# Patient Record
Sex: FEMALE
[De-identification: names, ages, dates, MRNs, and addresses within clinical notes are randomized per-mention and may not be internally consistent; named-entity substitution may affect disease eponyms.]

---

## 2018-01-09 ENCOUNTER — HOSPITAL ENCOUNTER (OUTPATIENT)
Dept: HOSPITAL 14 - H.ER | Age: 53
Setting detail: OBSERVATION
LOS: 1 days | Discharge: HOME | End: 2018-01-10
Attending: FAMILY MEDICINE | Admitting: FAMILY MEDICINE
Payer: COMMERCIAL

## 2018-01-09 DIAGNOSIS — F41.9: ICD-10-CM

## 2018-01-09 DIAGNOSIS — F32.9: ICD-10-CM

## 2018-01-09 DIAGNOSIS — G45.4: Primary | ICD-10-CM

## 2018-01-09 DIAGNOSIS — E03.9: ICD-10-CM

## 2018-01-09 DIAGNOSIS — R73.03: ICD-10-CM

## 2018-01-09 LAB
ALBUMIN SERPL-MCNC: 4.4 G/DL (ref 3.5–5)
ALBUMIN/GLOB SERPL: 1.3 {RATIO} (ref 1–2.1)
ALT SERPL-CCNC: 43 U/L (ref 9–52)
APTT BLD: 29.4 SECONDS (ref 25.6–37.1)
AST SERPL-CCNC: 47 U/L (ref 14–36)
BACTERIA #/AREA URNS HPF: (no result) /[HPF]
BASOPHILS # BLD AUTO: 0 K/UL (ref 0–0.2)
BASOPHILS NFR BLD: 0.3 % (ref 0–2)
BILIRUB UR-MCNC: NEGATIVE MG/DL
BNP SERPL-MCNC: 134 PG/ML (ref 0–900)
BUN SERPL-MCNC: 14 MG/DL (ref 7–17)
CALCIUM SERPL-MCNC: 9 MG/DL (ref 8.4–10.2)
COLOR UR: (no result)
EOSINOPHIL # BLD AUTO: 0.1 K/UL (ref 0–0.7)
EOSINOPHIL NFR BLD: 0.7 % (ref 0–4)
ERYTHROCYTE [DISTWIDTH] IN BLOOD BY AUTOMATED COUNT: 14.4 % (ref 11.5–14.5)
GFR NON-AFRICAN AMERICAN: > 60
GLUCOSE UR STRIP-MCNC: (no result) MG/DL
HDLC SERPL-MCNC: 56 MG/DL (ref 30–70)
HGB BLD-MCNC: 12.5 G/DL (ref 12–16)
INR PPP: 1 (ref 0.9–1.2)
LDLC SERPL-MCNC: 87 MG/DL (ref 0–129)
LEUKOCYTE ESTERASE UR-ACNC: (no result) LEU/UL
LYMPHOCYTES # BLD AUTO: 2.4 K/UL (ref 1–4.3)
LYMPHOCYTES NFR BLD AUTO: 19.3 % (ref 20–40)
MCH RBC QN AUTO: 31.2 PG (ref 27–31)
MCHC RBC AUTO-ENTMCNC: 33 G/DL (ref 33–37)
MCV RBC AUTO: 94.4 FL (ref 81–99)
MONOCYTES # BLD: 0.8 K/UL (ref 0–0.8)
MONOCYTES NFR BLD: 6.1 % (ref 0–10)
NEUTROPHILS # BLD: 9.2 K/UL (ref 1.8–7)
NEUTROPHILS NFR BLD AUTO: 73.6 % (ref 50–75)
NRBC BLD AUTO-RTO: 0.1 % (ref 0–0)
PH UR STRIP: 7 [PH] (ref 5–8)
PLATELET # BLD: 281 K/UL (ref 130–400)
PMV BLD AUTO: 8 FL (ref 7.2–11.7)
PROT UR STRIP-MCNC: NEGATIVE MG/DL
PROTHROMBIN TIME: 10.9 SECONDS (ref 9.8–13.1)
RBC # BLD AUTO: 4.01 MIL/UL (ref 3.8–5.2)
RBC # UR STRIP: (no result) /UL
SP GR UR STRIP: < 1.005 (ref 1–1.03)
SQUAMOUS EPITHIAL: < 1 /HPF (ref 0–5)
URINE CLARITY: CLEAR
URINE NITRATE: NEGATIVE
UROBILINOGEN UR-MCNC: (no result) MG/DL (ref 0.2–1)
WBC # BLD AUTO: 12.5 K/UL (ref 4.8–10.8)

## 2018-01-09 PROCEDURE — 80353 DRUG SCREENING COCAINE: CPT

## 2018-01-09 PROCEDURE — 80324 DRUG SCREEN AMPHETAMINES 1/2: CPT

## 2018-01-09 PROCEDURE — 80361 OPIATES 1 OR MORE: CPT

## 2018-01-09 PROCEDURE — 80345 DRUG SCREENING BARBITURATES: CPT

## 2018-01-09 PROCEDURE — 84484 ASSAY OF TROPONIN QUANT: CPT

## 2018-01-09 PROCEDURE — 85730 THROMBOPLASTIN TIME PARTIAL: CPT

## 2018-01-09 PROCEDURE — 93005 ELECTROCARDIOGRAM TRACING: CPT

## 2018-01-09 PROCEDURE — 80349 CANNABINOIDS NATURAL: CPT

## 2018-01-09 PROCEDURE — 99285 EMERGENCY DEPT VISIT HI MDM: CPT

## 2018-01-09 PROCEDURE — 93880 EXTRACRANIAL BILAT STUDY: CPT

## 2018-01-09 PROCEDURE — 70450 CT HEAD/BRAIN W/O DYE: CPT

## 2018-01-09 PROCEDURE — 80346 BENZODIAZEPINES1-12: CPT

## 2018-01-09 PROCEDURE — 81003 URINALYSIS AUTO W/O SCOPE: CPT

## 2018-01-09 PROCEDURE — 82948 REAGENT STRIP/BLOOD GLUCOSE: CPT

## 2018-01-09 PROCEDURE — 83880 ASSAY OF NATRIURETIC PEPTIDE: CPT

## 2018-01-09 PROCEDURE — 84443 ASSAY THYROID STIM HORMONE: CPT

## 2018-01-09 PROCEDURE — 85025 COMPLETE CBC W/AUTO DIFF WBC: CPT

## 2018-01-09 PROCEDURE — 85610 PROTHROMBIN TIME: CPT

## 2018-01-09 PROCEDURE — 83036 HEMOGLOBIN GLYCOSYLATED A1C: CPT

## 2018-01-09 PROCEDURE — 70551 MRI BRAIN STEM W/O DYE: CPT

## 2018-01-09 PROCEDURE — 71045 X-RAY EXAM CHEST 1 VIEW: CPT

## 2018-01-09 PROCEDURE — 85651 RBC SED RATE NONAUTOMATED: CPT

## 2018-01-09 PROCEDURE — 80053 COMPREHEN METABOLIC PANEL: CPT

## 2018-01-09 PROCEDURE — 93306 TTE W/DOPPLER COMPLETE: CPT

## 2018-01-09 PROCEDURE — 36415 COLL VENOUS BLD VENIPUNCTURE: CPT

## 2018-01-09 PROCEDURE — 80358 DRUG SCREENING METHADONE: CPT

## 2018-01-09 PROCEDURE — 83992 ASSAY FOR PHENCYCLIDINE: CPT

## 2018-01-09 PROCEDURE — 80061 LIPID PANEL: CPT

## 2018-01-09 PROCEDURE — 81025 URINE PREGNANCY TEST: CPT

## 2018-01-09 NOTE — CP.PCM.HP
History of Present Illness





- History of Present Illness


History of Present Illness: 


53 yr old F brought to ED by ambulance for acute episode of AMS during her gym 

class this morning. PMHx includes hypothyroidism, prediabetes, anxiety and 

depression. Patient reports she was at the gym and during a "Bar Class" started 

to feel dizzy. She does not remember what happened after that nor the ambulance 

ride to the ED. Her  witnessed her episode of AMS and reported 

that she started to walk away from the exercise class before it was over, he 

found this odd and when talking to her it seemed she was confused, he proceeded 

to call an ambulance as she was acting not as herself. Denies syncope, head 

trauma, weakness, nausea, vomiting or diarrhea. Patient reports she did not eat 

breakfast before her gym class, she usually does not eat breakfast. Patient 

reports she has been exercising regularly 3 times a week for the past 3 months.





PMD: none


Specialists: Librado Cheek -Endocrinologist





LMP: 17, regular menses


OBHx: 





PMHx: hypothyroidism, prediabetes, anxiety and depression


SurgHx: appendectomy, D&C x 2 s/p miscarriage, uterine fibroidectomy


FMHx: mother is 74-has NIDDM, father  at 60 yrs old from brain cancer, 

siblings are healthy 


SocHx: denies smoking, drinks >7 glasses of wine on weekends (fri/sat/sun), 

denies drugs; lives with , does not work


Medications: alternate dose of Levothyroxine 75mcg and Levothyroxine 50 mcg QD, 

Metformin 500mg PO BID


Allergies: NKDA





Present on Admission





- Present on Admission


Any Indicators Present on Admission: No


History of DVT/PE: No


History of Uncontrolled Diabetes: No


Urinary Catheter: No


Decubitus Ulcer Present: No


History Surgical Site Infection Following: None





Review of Systems





- Review of Systems


All systems: reviewed and no additional remarkable complaints except (for what 

is mentioned in the HPI)





- Constitutional


Constitutional: absent: Chills, Headache





- EENT


Eyes: absent: Blurred Vision, Change in Vision


Ears: Dizziness


Nose/Mouth/Throat: absent: Nasal Congestion, Nasal Discharge





- Cardiovascular


Cardiovascular: absent: Chest Pain, Dyspnea





- Respiratory


Respiratory: absent: Hemoptysis





- Gastrointestinal


Gastrointestinal: absent: Abdominal Pain, Constipation, Diarrhea





- Genitourinary


Genitourinary: absent: Difficulty Urinating, Dysuria





- Reproductive: Female


Reproductive:Female: Normal Menses





- Musculoskeletal


Musculoskeletal: absent: Arthralgias, Myalgias





- Neurological


Neurological: absent: Syncope





- Psychiatric


Psychiatric: Anxiety, Depression





- Hematologic/Lymphatic


Hematologic: absent: Easy Bleeding, Easy Bruising





Past Patient History





- Past Medical History & Family History


Past Medical History?: Yes





- Past Social History


Alcohol: Social


Drugs: Denies





- CARDIAC


Hx Cardiac Disorders: No


Hx Angina: No


Hx Atrial Fibrillation: No


Hx Cardia Arrhythmia: No


Hx Circulatory Problems: No


Hx Congestive Heart Failure: No


Hx Heart Attack: No


Hx Heart Murmur: No


Hx Heart Transplant: No


Hx Hypercholesterolemia: No


Hx Hypertension: No


Hx Hypotension: No


Hx Internal Defibrillator: No


Hx Mitral Valve Prolapse: No


Hx Pacemaker: No





- NEUROLOGICAL


Hx Dementia: No


Hx Dizziness: No


Hx Meningitis: No


Hx Migraine: No


Hx Multiple Sclerosis: No


Hx Paralysis: No


Hx Parkinson's Disease: No


Hx Seizures: No


Hx Syncope: No


Hx Transient Ischemic Attacks (TIA): No





- HEENT


Hx Blind: No


Hx Cataracts: No


Hx Deafness: No


Hx Difficulty Chewing: No


Hx Epistaxis: No


Hx Glaucoma: No


Hx Macular Degeneration: No


Hx Sinusitis: No





- ENDOCRINE/METABOLIC


Hx Endocrine Disorders: Yes


Hx Hyperthyroidism: Yes


Hx Hypothyroidism: Yes





- PSYCHIATRIC


Hx Substance Use: No





- SURGICAL HISTORY


Hx Surgeries: Yes


Hx Appendectomy: Yes


Other/Comment: Fibroid removal





- ANESTHESIA


Hx Anesthesia: Yes


Hx Anesthesia Reactions: No





Meds


Allergies/Adverse Reactions: 


 Allergies











Allergy/AdvReac Type Severity Reaction Status Date / Time


 


No Known Allergies Allergy   Verified 18 13:22














Physical Exam





- Constitutional


Appears: No Acute Distress





- Head Exam


Head Exam: ATRAUMATIC, NORMOCEPHALIC





- Eye Exam


Eye Exam: EOMI, PERRL





- ENT Exam


ENT Exam: Mucous Membranes Moist





- Neck Exam


Neck exam: Positive for: Full Rom





- Respiratory Exam


Respiratory Exam: Clear to Auscultation Bilateral, NORMAL BREATHING PATTERN





- Cardiovascular Exam


Cardiovascular Exam: REGULAR RHYTHM, +S1, +S2





- GI/Abdominal Exam


GI & Abdominal Exam: Normal Bowel Sounds, Soft.  absent: Tenderness





- Extremities Exam


Extremities exam: Positive for: full ROM.  Negative for: pedal edema





- Back Exam


Back exam: absent: CVA tenderness (L), CVA tenderness (R)





- Neurological Exam


Neurological exam: Alert, CN II-XII Intact, Oriented x3





- Psychiatric Exam


Psychiatric exam: Normal Affect, Normal Mood





- Skin


Skin Exam: Dry, Intact, Normal Color, Warm





Results





- Vital Signs


Recent Vital Signs: 





 Last Vital Signs











Temp  98.7 F   18 16:07


 


Pulse  78   18 16:07


 


Resp  16   18 16:07


 


BP  131/64   18 16:07


 


Pulse Ox  100   18 16:16














- Labs


Result Diagrams: 


 18 12:15





 18 12:15


Labs: 





 Laboratory Results - last 24 hr











  18





  11:42 12:15 12:15


 


WBC    12.5 H


 


RBC    4.01


 


Hgb    12.5


 


Hct    37.8


 


MCV    94.4


 


MCH    31.2 H


 


MCHC    33.0


 


RDW    14.4


 


Plt Count    281


 


MPV    8.0


 


Neut % (Auto)    73.6


 


Lymph % (Auto)    19.3 L


 


Mono % (Auto)    6.1


 


Eos % (Auto)    0.7


 


Baso % (Auto)    0.3


 


Neut #    9.2 H


 


Lymph #    2.4


 


Mono #    0.8


 


Eos #    0.1


 


Baso #    0.0


 


PT   


 


INR   


 


APTT   


 


Sodium   137 


 


Potassium   4.7 


 


Chloride   104 


 


Carbon Dioxide   21 L 


 


Anion Gap   17 


 


BUN   14 


 


Creatinine   0.6 L 


 


Est GFR ( Amer)   > 60 


 


Est GFR (Non-Af Amer)   > 60 


 


POC Glucose (mg/dL)  126 H  


 


Random Glucose   120 H 


 


Calcium   9.0 


 


Total Bilirubin   0.8 


 


AST   47 H 


 


ALT   43 


 


Alkaline Phosphatase   55 


 


Troponin I   < 0.0120 


 


NT-Pro-B Natriuret Pep   134 


 


Total Protein   7.8 


 


Albumin   4.4 


 


Globulin   3.4 


 


Albumin/Globulin Ratio   1.3 


 


Triglycerides   97 


 


Cholesterol   171 


 


LDL Cholesterol Direct   87 


 


HDL Cholesterol   56 


 


Urine Color   


 


Urine Clarity   


 


Urine pH   


 


Ur Specific Gravity   


 


Urine Protein   


 


Urine Glucose (UA)   


 


Urine Ketones   


 


Urine Blood   


 


Urine Nitrate   


 


Urine Bilirubin   


 


Urine Urobilinogen   


 


Ur Leukocyte Esterase   


 


Urine RBC (Auto)   


 


Urine Microscopic WBC   


 


Ur Squamous Epith Cells   


 


Urine Bacteria   


 


Urine Opiates Screen   


 


Urine Methadone Screen   


 


Ur Barbiturates Screen   


 


Ur Phencyclidine Scrn   


 


Ur Amphetamines Screen   


 


U Benzodiazepines Scrn   


 


U Oth Cocaine Metabols   


 


U Cannabinoids Screen   














  18





  13:03 14:26 15:49


 


WBC   


 


RBC   


 


Hgb   


 


Hct   


 


MCV   


 


MCH   


 


MCHC   


 


RDW   


 


Plt Count   


 


MPV   


 


Neut % (Auto)   


 


Lymph % (Auto)   


 


Mono % (Auto)   


 


Eos % (Auto)   


 


Baso % (Auto)   


 


Neut #   


 


Lymph #   


 


Mono #   


 


Eos #   


 


Baso #   


 


PT   10.9 


 


INR   1.0 


 


APTT   29.4 


 


Sodium   


 


Potassium   


 


Chloride   


 


Carbon Dioxide   


 


Anion Gap   


 


BUN   


 


Creatinine   


 


Est GFR ( Amer)   


 


Est GFR (Non-Af Amer)   


 


POC Glucose (mg/dL)   


 


Random Glucose   


 


Calcium   


 


Total Bilirubin   


 


AST   


 


ALT   


 


Alkaline Phosphatase   


 


Troponin I   


 


NT-Pro-B Natriuret Pep   


 


Total Protein   


 


Albumin   


 


Globulin   


 


Albumin/Globulin Ratio   


 


Triglycerides   


 


Cholesterol   


 


LDL Cholesterol Direct   


 


HDL Cholesterol   


 


Urine Color  Straw  


 


Urine Clarity  Clear  


 


Urine pH  7.0  


 


Ur Specific Gravity  < 1.005  


 


Urine Protein  Negative  


 


Urine Glucose (UA)  Neg  


 


Urine Ketones  Negative  


 


Urine Blood  Small  


 


Urine Nitrate  Negative  


 


Urine Bilirubin  Negative  


 


Urine Urobilinogen  0.2-1.0  


 


Ur Leukocyte Esterase  Neg  


 


Urine RBC (Auto)  1  


 


Urine Microscopic WBC  1  


 


Ur Squamous Epith Cells  < 1  


 


Urine Bacteria  Rare  


 


Urine Opiates Screen    Negative


 


Urine Methadone Screen    Negative


 


Ur Barbiturates Screen    Negative


 


Ur Phencyclidine Scrn    Negative


 


Ur Amphetamines Screen    Negative


 


U Benzodiazepines Scrn    Negative


 


U Oth Cocaine Metabols    Negative


 


U Cannabinoids Screen    Negative














Assessment & Plan





- Assessment and Plan (Free Text)


Assessment: 


53 yr old F admitted for acute episode of AMS. 





1. Altered Mental Status


-acute, improving


-likely secondary to TIA vs seizure vs vasovagal reaction


-CT head wnl, Brain MRI wnl, CBC: WBC 12.5, rest wnl, EKG wnl, CXR wnl, UA wnl, 

Utox negative


-Neurology consult appreciated-will follow recommendations


-f/u labs, carotid duplex





2. Prediabetes


-controlled, HbA1c 5.8


-continue home medications: Metformin 500 mg PO BID


-moderate carbohydrate diet





3. Hypothyroidism


-chronic, controlled


-continue home medications: alternate Levothyroxine 75 mcg and Levothyroxine 50 

mcg PO QD


-f/u TSH





4. Anxiety and Depression


-chronic, controlled


-will be referred to mental health as outpatient





5. DVT prophylaxis


-Lovenox 40mg SC QD











- Date & Time


Date: 18


Time: 15:45

## 2018-01-09 NOTE — RAD
HISTORY:

code stroke  



COMPARISON:

No prior. 



FINDINGS:



LUNGS:

No active pulmonary disease.



PLEURA:

No significant pleural effusion identified, no pneumothorax apparent.



CARDIOVASCULAR:

Normal.



OSSEOUS STRUCTURES:

No significant abnormalities.



VISUALIZED UPPER ABDOMEN:

Normal.



OTHER FINDINGS:

None.



IMPRESSION:

No active disease.

## 2018-01-09 NOTE — CT
PROCEDURE:  CT HEAD WITHOUT CONTRAST.



HISTORY:

code stroke



COMPARISON:

None available. 



TECHNIQUE:

Axial computed tomography images were obtained through the head/brain 

without intravenous contrast.  



Radiation dose:



Total exam DLP = 865.3 mGy-cm.



This CT exam was performed using one or more of the following dose 

reduction techniques: Automated exposure control, adjustment of the 

mA and/or kV according to patient size, and/or use of iterative 

reconstruction technique.



FINDINGS:



HEMORRHAGE:

No intracranial hemorrhage. 



BRAIN:

No mass effect or edema.  No atrophy or chronic microvascular 

ischemic changes.



VENTRICLES:

Unremarkable. No hydrocephalus. 



CALVARIUM:

Unremarkable.



PARANASAL SINUSES:

Unremarkable as visualized. No significant inflammatory changes.



MASTOID AIR CELLS:

Unremarkable as visualized. No inflammatory changes.



OTHER FINDINGS:

None.



IMPRESSION:

No acute intracranial pathology.  



Findings conveyed to Dr. Rosales by Dr. Morley at 12:20 p.m. on 

01/09/2018.

## 2018-01-09 NOTE — CP.PCM.CON
History of Present Illness





- History of Present Illness


History of Present Illness: 





           53 yr old right handed woman with pmh of prediabetes, and 

hypothyroidism, who is here for a discrete episode of forgetfulness with 

confusion and memory loss. The patient was in her [place] exercise class, a class 

in which she normally participates, and she suddenly felt disoriented and 

confused. The next thing she remembers, she was in the [facility]. There was no 

aphasia, no tonic clonic movements, no urinary incontinence, nor have there 

ever been any prior spells. She denies sleep deprivation, recent change in 

medications, surgeries, or illicit drug use, or concussion. On exam, the 

patient has a nonfocal exam. 





Review of Systems





- Review of Systems


Systems not reviewed;Unavailable: Altered Mental Status





- Neurological


Neurological: Behavioral Changes, Dizziness





Past Patient History





- Past Medical History & Family History


Past Medical History?: Yes





- Past Social History


Smoking Status: Never Smoked


Alcohol: Other


Drugs: Denies





- CARDIAC


Hx Cardiac Disorders: No


Hx Angina: No


Hx Atrial Fibrillation: No


Hx Cardia Arrhythmia: No


Hx Circulatory Problems: No


Hx Congestive Heart Failure: No


Hx Heart Attack: No


Hx Heart Murmur: No


Hx Heart Transplant: No


Hx Hypercholesterolemia: No


Hx Hypertension: No


Hx Hypotension: No


Hx Internal Defibrillator: No


Hx Mitral Valve Prolapse: No


Hx Pacemaker: No





- NEUROLOGICAL


Hx Dementia: No


Hx Dizziness: No


Hx Meningitis: No


Hx Migraine: No


Hx Multiple Sclerosis: No


Hx Paralysis: No


Hx Parkinson's Disease: No


Hx Seizures: No


Hx Syncope: No


Hx Transient Ischemic Attacks (TIA): No





- HEENT


Hx Blind: No


Hx Cataracts: No


Hx Deafness: No


Hx Difficulty Chewing: No


Hx Epistaxis: No


Hx Glaucoma: No


Hx Macular Degeneration: No


Hx Sinusitis: No





- ENDOCRINE/METABOLIC


Hx Endocrine Disorders: Yes


Hx Hyperthyroidism: Yes


Hx Hypothyroidism: Yes





- PSYCHIATRIC


Hx Substance Use: No





- SURGICAL HISTORY


Hx Surgeries: Yes


Hx Appendectomy: Yes


Other/Comment: Fibroid removal





- ANESTHESIA


Hx Anesthesia: Yes


Hx Anesthesia Reactions: No





Meds


Allergies/Adverse Reactions: 


 Allergies











Allergy/AdvReac Type Severity Reaction Status Date / Time


 


No Known Allergies Allergy   Verified 01/09/18 13:22














Physical Exam





- Head Exam


Head Exam: ATRAUMATIC, NORMAL INSPECTION, NORMOCEPHALIC





- Eye Exam


Eye Exam: EOMI





- Expanded Neurological Exam


  ** Expanded


Neurological exam: Memory Loss-Recent Event


Patient oriented to: person, place, time


Cranial nerves: EOM's Intact: Normal, Facial Palsey w/Forehead Movement: Normal

, Facial Palsey w/o Forehead Movement: Normal, Facial Sensation: Normal, Gag 

Reflex: Normal, Nystagmus: Normal


Ataxia: No


Cerebellar Function: Finger to Nose: Normal, Heel to Shin: Normal, Romberg: 

Normal


Upper motor neuron: Babinski Sign: Normal


Sensory exam: Lower Extremity 2 Point Discrimination: Normal, Lower Extremity 

Light Touch: Normal, Lower Extremity Pin Prick: Normal, Lower Extremity 

Temperature: Normal, Upper Extremity 2 Point Discrimination: Normal, Upper 

Extremity Light Touch: Normal, Upper Extremity Pin Prick: Normal, Upper 

Extremity Temperature: Normal


Neuro motor strength exam: Left Upper Extremity: 5, Right Upper Extremity: 5, 

Left Lower Extremity: 5, Right Lower Extremity: 5


DTR: Achilles Tendon Left: 1+, Achilles Tendon Right: 1+, Bicep Left: 2+, Bicep 

Right: 2+, Brachioradialis Left: 2+, Brachioradialis Right: 2+, Patellar Left: 2

+, Patellar Right: 2+, Tricep Left: 2+, Tricep Right: 2+


Coma Scale Verbal: Oriented





- Psychiatric Exam


Psychiatric exam: Flat Affect





Results





- Vital Signs


Recent Vital Signs: 


 Last Vital Signs











Temp      


 


Pulse  82   01/09/18 11:44


 


Resp  18   01/09/18 11:44


 


BP  140/94 H  01/09/18 11:44


 


Pulse Ox  100   01/09/18 12:55














- Labs


Result Diagrams: 


 01/09/18 12:15





 01/09/18 12:15


Labs: 


 Laboratory Results - last 24 hr











  01/09/18 01/09/18 01/09/18





  11:42 12:15 12:15


 


WBC    12.5 H


 


RBC    4.01


 


Hgb    12.5


 


Hct    37.8


 


MCV    94.4


 


MCH    31.2 H


 


MCHC    33.0


 


RDW    14.4


 


Plt Count    281


 


MPV    8.0


 


Neut % (Auto)    73.6


 


Lymph % (Auto)    19.3 L


 


Mono % (Auto)    6.1


 


Eos % (Auto)    0.7


 


Baso % (Auto)    0.3


 


Neut #    9.2 H


 


Lymph #    2.4


 


Mono #    0.8


 


Eos #    0.1


 


Baso #    0.0


 


Sodium   137 


 


Potassium   4.7 


 


Chloride   104 


 


Carbon Dioxide   21 L 


 


Anion Gap   17 


 


BUN   14 


 


Creatinine   0.6 L 


 


Est GFR ( Amer)   > 60 


 


Est GFR (Non-Af Amer)   > 60 


 


POC Glucose (mg/dL)  126 H  


 


Random Glucose   120 H 


 


Calcium   9.0 


 


Total Bilirubin   0.8 


 


AST   47 H 


 


ALT   43 


 


Alkaline Phosphatase   55 


 


Troponin I   < 0.0120 


 


NT-Pro-B Natriuret Pep   134 


 


Total Protein   7.8 


 


Albumin   4.4 


 


Globulin   3.4 


 


Albumin/Globulin Ratio   1.3 


 


Triglycerides   97 


 


Cholesterol   171 


 


LDL Cholesterol Direct   87 


 


HDL Cholesterol   56 














Assessment & Plan





- Assessment and Plan (Free Text)


Assessment: 





53 yr old woman with normal neurological exam and spell that may have been TIA, 

complex partial seizure, or Transient Global AMnesia 


PLan: 


1. MRI BRain without gadolinium, stroke protocol. IF normal patient may be 

discharged and obtain EEG outpatient with our office 


2. Reevaluate after MRI Brain.

## 2018-01-09 NOTE — ED PDOC
HPI:STROKE





- Time


Time: 11:00





- Historian


Historian: Patient, EMS





- Chief Complaint


Chief Complaint: Confusion





- Onset


Date: 01/09/18


Time: 11:00


Onset: Just prior to presenting





- Notes:


Notes:: 





53 year old female who presents to the emergency department via EMS for an 

evaluation of AMS after patient displayed moderate amnesia and confusion while 

at the gym prior to arrival, thus, prompting staff to call EMS. Denied any 

prior incident or head trauma.





PMD: none provided





rTPA Inclusion/Exclusion





- Refusal of Treatment


Patient Refused Treatment: No





- Inclusion Criteria for Altepase


Patient is 18 years or Older: Yes


The Clinical Diagnosis of Ischemic Stroke That is Causing a Potentially 

Disabling Neurological Deficit: No


Time of Onset is Well Established to be Less Than 270 Minute Before Treatment 

Would Begin: Yes


Risk/Benefit Discussed With Patient/Family Member Present: Yes





- Warning to TPA With Conditions


Following Conditions Weighed Against Anticipated Benefit: Yes


Condition: Stroke Serevity Too Mild





Past Medical History


Reviewed: Historical Data, Nursing Documentation, Vital Signs


Vital Signs: 





 Last Vital Signs











Temp      


 


Pulse  82   01/09/18 11:44


 


Resp  18   01/09/18 11:44


 


BP  140/94 H  01/09/18 11:44


 


Pulse Ox  100   01/09/18 11:44














- Surgical History


Surgical History: 


   Denies: No Surg Hx


Other surgeries: fibroid removal





- Family History


Family History: States: Unknown Family Hx





- Social History


Current smoker - smoking cessation education provided: No


Ex-Smoker (has not smoked in the last 12 months): No


Alcohol: Social


Drugs: Denies





- Allergies


Allergies/Adverse Reactions: 


 Allergies











Allergy/AdvReac Type Severity Reaction Status Date / Time


 


No Known Allergies Allergy   Verified 01/09/18 13:22














Review of Systems


Eyes: Negative for: Vision Change (blurry or double)


Neurological: Positive for: Confusion, Altered Mental Status.  Negative for: 

Weakness, Numbness, Headache





Physical Exam





- Reviewed


Nursing Documentation Reviewed: Yes


Vital Signs Reviewed: Yes





- Physical Exam


Appears: Positive for: Well, Non-toxic, No Acute Distress


Head Exam: Positive for: ATRAUMATIC, NORMAL INSPECTION, NORMOCEPHALIC


Eye Exam: Positive for: EOMI, PERRL, Other (vision intact)


Neck: Positive for: Normal, Painless ROM, Supple.  Negative for: Decreased ROM


Cardiovascular/Chest: Positive for: Regular Rate, Rhythm, Chest Non Tender.  

Negative for: Murmur


Respiratory: Positive for: Normal Breath Sounds.  Negative for: Decreased 

Breath Sounds, Wheezing, Respiratory Distress


Gastrointestinal/Abdominal: Positive for: Normal Exam, Soft.  Negative for: 

Tenderness


Back: Positive for: Normal Inspection.  Negative for: L CVA Tenderness, R CVA 

Tenderness


Extremity: Positive for: Normal ROM (upper/lower).  Negative for: Pedal Edema (

bilateral), Calf Tenderness (bilateral)


Neurologic/Psych: Positive for: Alert (to person only), CNs II-XII (intact).  

Negative for: Motor/Sensory Deficits





- Laboratory Results


Result Diagrams: 


 01/09/18 12:15





 01/09/18 12:15





- ECG


O2 Sat by Pulse Oximetry: 100 (RA)


Pulse Ox Interpretation: Normal





Medical Decision Making


Medical Decision Making: 





Initial Impression: Possible CVA





Initial Plan:   


* Type and screen


* CT head


* EKG


* BNP


* CMP


* Hemoglobin A1C


* Lipid panel


* Troponin I


* CBC


* PTT


* PT


* CXR


* MRI brain


* Glucose, blood, POC


* Urinalysis


________________________________________________________________________________

_____





Time: 1152


--EKG: NSR at 84 BMP. No ST changes, T-wave inversion or Q-wave present.





--CXR


FINDINGS:


LUNGS:


No active pulmonary disease.


PLEURA:


No significant pleural effusion identified, no pneumothorax apparent.


CARDIOVASCULAR:


Normal.


OSSEOUS STRUCTURES:


No significant abnormalities.


VISUALIZED UPPER ABDOMEN:


Normal.


OTHER FINDINGS:


None.





IMPRESSION:


No active disease.


________________________________________________________________________________

______





Time: 1210


--Neurology consult with Dr. Pinedo who does not believe patient to be a TPA 

candidate based on mild symptoms. 


--Recommends MRI brain without contrast


________________________________________________________________________________

______


 


Time: 1222


--CT head


FINDINGS:


HEMORRHAGE:


No intracranial hemorrhage. 


BRAIN:


No mass effect or edema.  No atrophy or chronic microvascular ischemic changes.


VENTRICLES:


Unremarkable. No hydrocephalus. 


CALVARIUM:


Unremarkable.


PARANASAL SINUSES:


Unremarkable as visualized. No significant inflammatory changes.


MASTOID AIR CELLS:


Unremarkable as visualized. No inflammatory changes.


OTHER FINDINGS:


None.





IMPRESSION:


No acute intracranial pathology.  





Time: 1422


--Brain MRI


FINDINGS:


HEMORRHAGE:


None


DWI:


No evidence of an acute or early subacute infarction.


BRAIN PARENCHYMA:


There is a small focus of T2/FLAIR hyperintensity in the left anterior parietal 

subcortical white matter and similar smaller focus in the left posterior 

temporal subcortical white matter. There is no territorial infarction.  There 

is no mass, mass effect or abnormal extra-axial fluid collection. The midline 

sagittal structures are normal.


VENTRICLES:


There is mild global parenchymal volume loss and proportionate enlargement of 

the ventricles and cortical sulci, advanced for the patient's age. 


CRANIUM:


There is normal bone marrow signal pattern.


ORBITS:


Grossly unremarkable.


PARANASAL SINUSES/MASTOIDS:


Predominantly clear.


VASCULAR SYSTEM:


There are normal signal voids in the larger intracranial arteries.


OTHER FINDINGS:


None. 





IMPRESSION:


1. No acute intracranial abnormality.


2. Small foci of white matter changes in the left parietal and posterior 

temporal subcortical white matter are strictly nonspecific, the differential 

considerations include migraine headache effect, gliosis, and early chronic 

microangiopathic changes amongst others.





--------------------------------------------------------------------------------

------------------


Scribe Attestation:


Documented by Gisell Ayala, acting as a scribe for Abisai Rosales MD.





Provider Scribe Attestation:


All medical record entries made by the Scribe were at my direction and 

personally dictated by me. I have reviewed the chart and agree that the record 

accurately reflects my personal performance of the history, physical exam, 

medical decision making, and the department course for this patient. I have 

also personally directed, reviewed, and agree with the discharge instructions 

and disposition.





Disposition





- Clinical Impression


Clinical Impression: 


 TIA (transient ischemic attack)





Counseled Patient/Family Regarding: Studies Performed, Diagnosis, Need For 

Followup





- Disposition


Disposition Time: 15:00


Condition: STABLE


Forms:  [employer] (English)





- Pt Status Changed To:


Hospital Disposition Of: Observation





- POA


Present On Arrival: None

## 2018-01-09 NOTE — MRI
PROCEDURE:  MRI BRAIN WITHOUT CONTRAST



HISTORY:

global amnesia



COMPARISON:

Noncontrast head CT from 01/09/2018. 



TECHNIQUE:

Multiplanar, multisequence MR images of the brain were obtained 

without intravenous contrast enhancement.



FINDINGS:



HEMORRHAGE:

None



DWI:

No evidence of an acute or early subacute infarction.



BRAIN PARENCHYMA:

There is a small focus of T2/FLAIR hyperintensity in the left 

anterior parietal subcortical white matter and similar smaller focus 

in the left posterior temporal subcortical white matter. There is no 

territorial infarction.  There is no mass, mass effect or abnormal 

extra-axial fluid collection. The midline sagittal structures are 

normal.



VENTRICLES:

There is mild global parenchymal volume loss and proportionate 

enlargement of the ventricles and cortical sulci, advanced for the 

patient's age. 



CRANIUM:

There is normal bone marrow signal pattern.



ORBITS:

Grossly unremarkable.



PARANASAL SINUSES/MASTOIDS:

Predominantly clear.



VASCULAR SYSTEM:

There are normal signal voids in the larger intracranial arteries.



OTHER FINDINGS:

None. 



IMPRESSION:

1. No acute intracranial abnormality.



2. Small foci of white matter changes in the left parietal and 

posterior temporal subcortical white matter are strictly nonspecific, 

the differential considerations include migraine headache effect, 

gliosis, and early chronic microangiopathic changes amongst others.

## 2018-01-10 VITALS
HEART RATE: 67 BPM | TEMPERATURE: 98 F | DIASTOLIC BLOOD PRESSURE: 71 MMHG | RESPIRATION RATE: 20 BRPM | SYSTOLIC BLOOD PRESSURE: 108 MMHG

## 2018-01-10 VITALS — OXYGEN SATURATION: 98 %

## 2018-01-10 NOTE — CP.PCM.DIS
Provider





- Provider


Date of Admission: 


01/09/18 15:35





Attending physician: 


Clive Enamorado MD





Primary care physician: 


none, will f/u with Dr. Enamorado





Consults: 


Dr. Pinedo-neurology





Time Spent in preparation of Discharge (in minutes): 30





Diagnosis





- Discharge Diagnosis


(1) Transient global amnesia


Status: Resolved   Priority: Low   





Hospital Course





- Lab Results


Lab Results: 


 Most Recent Lab Values











WBC  12.5 K/uL (4.8-10.8)  H  01/09/18  12:15    


 


RBC  4.01 Mil/uL (3.80-5.20)   01/09/18  12:15    


 


Hgb  12.5 g/dL (12.0-16.0)   01/09/18  12:15    


 


Hct  37.8 % (34.0-47.0)   01/09/18  12:15    


 


MCV  94.4 fl (81.0-99.0)   01/09/18  12:15    


 


MCH  31.2 pg (27.0-31.0)  H  01/09/18  12:15    


 


MCHC  33.0 g/dL (33.0-37.0)   01/09/18  12:15    


 


RDW  14.4 % (11.5-14.5)   01/09/18  12:15    


 


Plt Count  281 K/uL (130-400)   01/09/18  12:15    


 


MPV  8.0 fl (7.2-11.7)   01/09/18  12:15    


 


Neut % (Auto)  73.6 % (50.0-75.0)   01/09/18  12:15    


 


Lymph % (Auto)  19.3 % (20.0-40.0)  L  01/09/18  12:15    


 


Mono % (Auto)  6.1 % (0.0-10.0)   01/09/18  12:15    


 


Eos % (Auto)  0.7 % (0.0-4.0)   01/09/18  12:15    


 


Baso % (Auto)  0.3 % (0.0-2.0)   01/09/18  12:15    


 


Neut #  9.2 K/uL (1.8-7.0)  H  01/09/18  12:15    


 


Lymph #  2.4 K/uL (1.0-4.3)   01/09/18  12:15    


 


Mono #  0.8 K/uL (0.0-0.8)   01/09/18  12:15    


 


Eos #  0.1 K/uL (0.0-0.7)   01/09/18  12:15    


 


Baso #  0.0 K/uL (0.0-0.2)   01/09/18  12:15    


 


ESR  11 mm/hr (0-30)   01/10/18  04:15    


 


PT  10.9 Seconds (9.8-13.1)   01/09/18  14:26    


 


INR  1.0  (0.9-1.2)   01/09/18  14:26    


 


APTT  29.4 Seconds (25.6-37.1)   01/09/18  14:26    


 


Sodium  137 mmol/l (132-148)   01/09/18  12:15    


 


Potassium  4.7 MMOL/L (3.6-5.0)   01/09/18  12:15    


 


Chloride  104 mmol/L ()   01/09/18  12:15    


 


Carbon Dioxide  21 mmol/L (22-30)  L  01/09/18  12:15    


 


Anion Gap  17  (10-20)   01/09/18  12:15    


 


BUN  14 mg/dl (7-17)   01/09/18  12:15    


 


Creatinine  0.6 mg/dl (0.7-1.2)  L  01/09/18  12:15    


 


Est GFR ( Amer)  > 60   01/09/18  12:15    


 


Est GFR (Non-Af Amer)  > 60   01/09/18  12:15    


 


POC Glucose (mg/dL)  91 mg/dL ()   01/10/18  12:14    


 


Random Glucose  120 mg/dL ()  H  01/09/18  12:15    


 


Hemoglobin A1c  5.8 % (4.2-6.5)   01/09/18  13:30    


 


Calcium  9.0 mg/dL (8.4-10.2)   01/09/18  12:15    


 


Total Bilirubin  0.8 mg/dl (0.2-1.3)   01/09/18  12:15    


 


AST  47 U/L (14-36)  H  01/09/18  12:15    


 


ALT  43 U/L (9-52)   01/09/18  12:15    


 


Alkaline Phosphatase  55 U/L ()   01/09/18  12:15    


 


Troponin I  < 0.0120 ng/mL (0.00-0.120)   01/09/18  12:15    


 


NT-Pro-B Natriuret Pep  134 pg/ml (0-900)   01/09/18  12:15    


 


Total Protein  7.8 G/DL (6.3-8.2)   01/09/18  12:15    


 


Albumin  4.4 g/dL (3.5-5.0)   01/09/18  12:15    


 


Globulin  3.4 gm/dL (2.2-3.9)   01/09/18  12:15    


 


Albumin/Globulin Ratio  1.3  (1.0-2.1)   01/09/18  12:15    


 


Triglycerides  97 mg/DL (0-149)   01/09/18  12:15    


 


Cholesterol  171 mg/dL (0-199)   01/09/18  12:15    


 


LDL Cholesterol Direct  87 mg/dL (0-129)   01/09/18  12:15    


 


HDL Cholesterol  56 MG/DL (30-70)   01/09/18  12:15    


 


TSH 3rd Generation  1.20 mIU/ML (0.46-4.68)   01/10/18  04:15    


 


Urine Color  Straw  (YELLOW)   01/09/18  13:03    


 


Urine Clarity  Clear  (Clear)   01/09/18  13:03    


 


Urine pH  7.0  (5.0-8.0)   01/09/18  13:03    


 


Ur Specific Gravity  < 1.005  (1.003-1.030)   01/09/18  13:03    


 


Urine Protein  Negative mg/dL (NEGATIVE)   01/09/18  13:03    


 


Urine Glucose (UA)  Neg mg/dL (Normal)   01/09/18  13:03    


 


Urine Ketones  Negative mg/dL (NEGATIVE)   01/09/18  13:03    


 


Urine Blood  Small  (NEGATIVE)   01/09/18  13:03    


 


Urine Nitrate  Negative  (NEGATIVE)   01/09/18  13:03    


 


Urine Bilirubin  Negative  (NEGATIVE)   01/09/18  13:03    


 


Urine Urobilinogen  0.2-1.0 mg/dL (0.2-1.0)   01/09/18  13:03    


 


Ur Leukocyte Esterase  Neg Fanny/uL (Negative)   01/09/18  13:03    


 


Urine RBC (Auto)  1 /hpf (0-3)   01/09/18  13:03    


 


Urine Microscopic WBC  1 /hpf (0-5)   01/09/18  13:03    


 


Ur Squamous Epith Cells  < 1 /hpf (0-5)   01/09/18  13:03    


 


Urine Bacteria  Rare  (<OCC)   01/09/18  13:03    


 


Urine Opiates Screen  Negative  (NEGATIVE)   01/09/18  15:49    


 


Urine Methadone Screen  Negative  (NEGATIVE)   01/09/18  15:49    


 


Ur Barbiturates Screen  Negative  (NEGATIVE)   01/09/18  15:49    


 


Ur Phencyclidine Scrn  Negative  (NEGATIVE)   01/09/18  15:49    


 


Ur Amphetamines Screen  Negative  (NEGATIVE)   01/09/18  15:49    


 


U Benzodiazepines Scrn  Negative  (NEGATIVE)   01/09/18  15:49    


 


U Oth Cocaine Metabols  Negative  (NEGATIVE)   01/09/18  15:49    


 


U Cannabinoids Screen  Negative  (NEGATIVE)   01/09/18  15:49    














- Hospital Course


Hospital Course: 


53 yr old F admitted for acute episode of AMS. Episode resolved and patient was 

evaluated by neurology. Echo, Head CT and Brain MRI were within normal limits. 

Patient was medically stable for discharge with instructions to follow up with 

Dr. Enamorado within 1 week and take aspirin 81mg PO QD. 








- Date & Time of H&P


Date of H&P: 01/09/18


Time of H&P: 17:03





Discharge Exam





- Head Exam


Head Exam: ATRAUMATIC, NORMAL INSPECTION, NORMOCEPHALIC





- Eye Exam


Eye Exam: EOMI





- ENT Exam


ENT Exam: Mucous Membranes Moist





- Neck Exam


Neck exam: Full Rom





- Respiratory Exam


Respiratory Exam: NORMAL BREATHING PATTERN





- Cardiovascular Exam


Cardiovascular Exam: REGULAR RHYTHM, +S1, +S2





- GI/Abdominal Exam


GI & Abdominal Exam: Normal Bowel Sounds.  absent: Tenderness





- Extremities Exam


Extremities exam: full ROM





- Neurological Exam


Neurological exam: Alert, CN II-XII Intact, Normal Gait, Oriented x3





- Psychiatric Exam


Psychiatric exam: Normal Affect, Normal Mood





- Skin


Skin Exam: Dry, Warm





Discharge Plan





- Follow Up Plan


Condition: STABLE


Disposition: HOME/ ROUTINE


Instructions:  Stroke (DC)


Additional Instructions: 


Follow up with primary doctor in 1-2 weeks and with neurologist in 1 week. Take 

aspirin 81mg every day.





Clinical Quality Measures





- Date & Time of Discharge Summary


Date of Discharge Summary: 01/10/18


Time of Discharge Summary: 22:22

## 2018-01-10 NOTE — US
PROCEDURE:  Duplex ultrasound  of the carotid and vertebral arteries. 



HISTORY:

s/p acute episode of AMS, possible TIA



COMPARISON:

None available. 



TECHNIQUE:

Grayscale and duplex Doppler evaluation of the cervical carotid and 

vertebral arteries were performed. The common carotid, carotid 

bifurcations and cervical ICA and proximal ECA were evaluated.  The 

vertebral arteries were evaluated for gross patency and direction. 



FINDINGS:



RIGHT CAROTID ARTERIES:

Common Carotid Artery: Normal. Maximal flow velocity of 118.1 cm/s.



Carotid Bifurcation: Normal.



Internal Carotid Artery:Normal. Maximal flow velocity of 160.6 cm/s.



External Carotid Artery (proximal branches): Normal. Maximal flow 

velocity of 98.2 cm/s.



ICA/CCA Ratio: 1.9



LEFT CAROTID ARTERIES:

Common Carotid Artery: Normal. Maximal flow velocity of 104 cm/s.



Carotid Bifurcation: Normal.



Internal Carotid Artery:Normal. Maximal flow velocity of 119.3 cm/s.



External Carotid Artery (proximal branches): Normal. Maximal flow 

velocity of 100.2 cm/s.



ICA/CCA Ratio: 1.1



VERTEBRAL ARTERIES:

Right Vertebral Artery: Patent. Antegrade flow.



Left Vertebral Artery: Patent. Antegrade flow.



OTHER FINDINGS:

None.



IMPRESSION:

1. Elevated peak systolic velocity in the mid and distal right 

internal carotid artery corresponding to 50-69% stenosis. 



2. No evidence of hemodynamically significant stenosis in the 

internal carotid arteries. 



3. Patent bilateral vertebral arteries with antegrade flow.

## 2018-01-10 NOTE — CARD
--------------- APPROVED REPORT --------------





EXAM: Two-dimensional and M-mode echocardiogram with Doppler and 

color Doppler.



Other Information 

Quality : AverageRhythm : 



INDICATION

Syncope 



2D DIMENSIONS 

IVSd0.92   (0.7-1.1cm)LVDd3.76   (3.9-5.9cm)

PWd1.24   (0.7-1.1cm)IVSs1.38   (0.8-1.2cm)

LVDs2.31   (2.5-4.0cm)FS (%) 38.7   %

PWs1.55   (0.8-1.2cm)LVEF (%)69.0   (>50%)



M-Mode DIMENSIONS 

Left Atrium (MM)3.09   (2.5-4.0cm)IVSd1.01   (0.7-1.1cm)

Aortic Root2.22   (2.2-3.7cm)LVDd4.04   (4.0-5.6cm)

Aortic Cusp Exc.1.36   (1.5-2.0cm)PWd0.90   (0.7-1.1cm)

IVSs1.27   cmFS (%) 44   %

LVDs2.28   (2.0-3.8cm)PWs1.70   cm



Mitral Valve

MV E Ppkelhaw52.8cm/sMV E Peak Gr.65mmHgMV DECEL [id number]

MV A Zzudnlfx18.5cm/sMV URW71soS/A ratio1.0

MVA (PHT)3.70cm2



TDI

Lateral E' Peak V9.04cm/sE/Lateral E'8.3E/Medial E'0.0



Tricuspid Valve

TR Peak Ocimksry122xl/sRAP [id number] Peak Gr.19mmHg

[id number]



 LEFT VENTRICLE 

The left ventricle is normal in size.

There is normal left ventricular wall thickness.

The left ventricular function is normal.

The left ventricular ejection fraction is - 70%.

There is normal LV segmental wall motion.

Transmitral Doppler flow pattern is Grade I-abnormal relaxation 

pattern.

No left ventricle thrombus noted on this study.

There is no ventricular septal defect visualized.

There is no left ventricular aneurysm. 

There is no mass noted in the left ventricle.



 RIGHT VENTRICLE 

The right ventricle is normal size.

There is normal right ventricular wall thickness.

The right ventricular systolic function is normal.



 ATRIA 

The left atrium size is normal.

There is no thrombus suspected in the left atrium.

The right atrium size is normal.

The interatrial septum is intact with no evidence for an atrial 

septal defect.



 AORTIC VALVE 

The aortic valve is normal in structure.

No aortic regurgitation is present.

There is no aortic valvular stenosis. 



 MITRAL VALVE 

The mitral valve is normal in structure.

There is no evidence of mitral valve prolapse.

There is no mitral valve stenosis.

Mitral regurgitation is mild.



 TRICUSPID VALVE 

The tricuspid valve is normal in structure.

There is mild tricuspid regurgitation.

Right ventricular systolic pressure is estimated at 29 mmHg. 

There is no tricuspid valve prolapse or vegetation.

There is no tricuspid valve stenosis. 



 PULMONIC VALVE 

The pulmonic valve is not well visualized.

There is no pulmonic valvular regurgitation. 



 GREAT VESSELS 

The aortic root is normal in size.

The IVC was not well visualized.



 PERICARDIAL EFFUSION 

There is a tiny posterior pericardial effusion.

There is no pleural effusion.



<Conclusion>

The left ventricle is normal in size and wall thickness.

The left ventricular function is normal.

The left ventricular ejection fraction is - 70%.

The left atrium, right ventricle and right atrium are normal in size.

The mitral, aortic and tricuspid valves are normal.

There is mild regurgitation of the mitral and tricuspid valves.

## 2018-01-10 NOTE — CP.PCM.PN
Subjective





- Date & Time of Evaluation


Date of Evaluation: 01/10/18


Time of Evaluation: 10:06





- Subjective


Subjective: 





Ms. Pierre was seen and examined at the bedside. She is alert, oriented in all 

spheres. She is able to follow simple commands. She denies any blurred vision, 

dizziness, lightheadedness, numbness, nausea, or vomiting. She states of 

experiencing mild headache, generalized, with pain scale 2/10. Mri showed  no 

acute intracranial abnormality. There was no untoward events overnight.





Objective





- Vital Signs/Intake and Output


Vital Signs (last 24 hours): 


 











Temp Pulse Resp BP Pulse Ox


 


 97.9 F   63   18   102/67   98 


 


 01/10/18 07:44  01/10/18 09:00  01/10/18 07:44  01/10/18 07:44  01/10/18 07:44











- Medications


Medications: 


 Current Medications





Enoxaparin Sodium (Lovenox)  40 mg SC DAILY [facility]


   PRN Reason: Protocol


   Last Admin: 01/10/18 09:17 Dose:  40 mg


Levothyroxine Sodium (Synthroid)  50 mcg PO TTS@0630 [facility]


Levothyroxine Sodium (Synthroid)  75 mcg PO MWF@0630 [facility]


   Last Admin: 01/10/18 06:02 Dose:  75 mcg


Levothyroxine Sodium (Synthroid)  50 mcg PO SUN@0630 [facility]


Metformin HCl (Glucophage)  500 mg PO BIDWM [facility]


   Last Admin: 01/10/18 09:17 Dose:  500 mg











- Labs


Labs: 


 





 01/09/18 12:15 





 01/09/18 12:15 





 











PT  10.9 Seconds (9.8-13.1)   01/09/18  14:26    


 


INR  1.0  (0.9-1.2)   01/09/18  14:26    


 


APTT  29.4 Seconds (25.6-37.1)   01/09/18  14:26    














- Constitutional


Appears: No Acute Distress





- Head Exam


Head Exam: NORMAL INSPECTION





- Neurological Exam


Neurological Exam: Alert, Awake, CN II-XII Intact, Oriented x3


Neuro motor strength exam: Left Upper Extremity: 5, Right Upper Extremity: 5, 

Left Lower Extremity: 5, Right Lower Extremity: 5


Additional comments: 





She is able to follow simple commands and sensation remains intact.





Assessment and Plan


(1) TIA (transient ischemic attack)


Assessment & Plan: 


Case discussed with Dr. Pinedo, continue all current medical regimen. Recommend 

echocardiogram and EEG will be done as an outpatient.


Status: Acute

## 2018-01-10 NOTE — CARD
--------------- APPROVED REPORT --------------





EKG Measurement

Heart [id number]

CO [id number]

[id number]

[id number]

[id number]



<Conclusion>

Normal sinus rhythm

Nonspecific ST and T wave abnormality

Abnormal ECG